# Patient Record
Sex: FEMALE | URBAN - METROPOLITAN AREA
[De-identification: names, ages, dates, MRNs, and addresses within clinical notes are randomized per-mention and may not be internally consistent; named-entity substitution may affect disease eponyms.]

---

## 2021-07-01 ENCOUNTER — OFFICE VISIT (OUTPATIENT)
Dept: URBAN - METROPOLITAN AREA CLINIC 59 | Facility: CLINIC | Age: 76
End: 2021-07-01
Payer: MEDICARE

## 2021-07-01 DIAGNOSIS — H25.13 AGE-RELATED NUCLEAR CATARACT, BILATERAL: ICD-10-CM

## 2021-07-01 DIAGNOSIS — H52.4 PRESBYOPIA: ICD-10-CM

## 2021-07-01 DIAGNOSIS — M35.00 SICCA SYNDROME, UNSPECIFIED: Primary | ICD-10-CM

## 2021-07-01 PROCEDURE — 92004 COMPRE OPH EXAM NEW PT 1/>: CPT | Performed by: OPTOMETRIST

## 2021-07-01 PROCEDURE — 92025 CPTRIZED CORNEAL TOPOGRAPHY: CPT | Performed by: OPTOMETRIST

## 2021-07-01 PROCEDURE — 92310 CONTACT LENS FITTING OU: CPT | Performed by: OPTOMETRIST

## 2021-07-01 ASSESSMENT — INTRAOCULAR PRESSURE
OD: 17
OS: 16

## 2021-07-01 ASSESSMENT — VISUAL ACUITY
OD: 20/20
OS: 20/20

## 2021-07-01 NOTE — IMPRESSION/PLAN
Impression: Presbyopia: H52.4. Plan: Patient given new CLs today. Patient educated that Sjogren's syndrome make it difficult to wear contacts.

## 2021-07-01 NOTE — IMPRESSION/PLAN
Impression: Sicca syndrome, Sjogrens syndrome: M35.00. Plan: Patient diagnosed with Sjogrens. Patient continue to use artificial tears QID. Patient educated on gel drops for night time. Topography done today. Patient educated that dry eye makes it difficult to wear contacts. Patient to wear part time. Monitor. 

Emre: Irregular tear film OU

## 2022-07-07 ENCOUNTER — OFFICE VISIT (OUTPATIENT)
Dept: URBAN - METROPOLITAN AREA CLINIC 59 | Facility: CLINIC | Age: 77
End: 2022-07-07
Payer: MEDICARE

## 2022-07-07 DIAGNOSIS — M35.00 SICCA SYNDROME, UNSPECIFIED: ICD-10-CM

## 2022-07-07 DIAGNOSIS — H25.13 AGE-RELATED NUCLEAR CATARACT, BILATERAL: Primary | ICD-10-CM

## 2022-07-07 PROCEDURE — 92014 COMPRE OPH EXAM EST PT 1/>: CPT | Performed by: OPTOMETRIST

## 2022-07-07 ASSESSMENT — INTRAOCULAR PRESSURE
OS: 16
OD: 17

## 2022-07-07 NOTE — IMPRESSION/PLAN
Impression: Sicca syndrome, Sjogrens syndrome: M35.00. Plan: Patient continue to use artificial tears QID and gel at bedtime.

## 2023-07-06 ENCOUNTER — OFFICE VISIT (OUTPATIENT)
Dept: URBAN - METROPOLITAN AREA CLINIC 59 | Facility: CLINIC | Age: 78
End: 2023-07-06
Payer: MEDICARE

## 2023-07-06 DIAGNOSIS — H52.4 PRESBYOPIA: ICD-10-CM

## 2023-07-06 DIAGNOSIS — H25.13 AGE-RELATED NUCLEAR CATARACT, BILATERAL: Primary | ICD-10-CM

## 2023-07-06 DIAGNOSIS — M35.00 SICCA SYNDROME, UNSPECIFIED: ICD-10-CM

## 2023-07-06 PROCEDURE — 99214 OFFICE O/P EST MOD 30 MIN: CPT | Performed by: OPTOMETRIST

## 2023-07-06 ASSESSMENT — INTRAOCULAR PRESSURE
OD: 16
OS: 16

## 2023-07-06 NOTE — IMPRESSION/PLAN
Impression: Sicca syndrome, Sjogrens syndrome: M35.00. Plan: Discussed findings in detail with patient. Patient to continue using artificial tears throughout the day to help alleviate dry eye symptoms.

## 2024-07-09 ENCOUNTER — OFFICE VISIT (OUTPATIENT)
Dept: URBAN - METROPOLITAN AREA CLINIC 59 | Facility: CLINIC | Age: 79
End: 2024-07-09
Payer: MEDICARE

## 2024-07-09 DIAGNOSIS — H43.813 VITREOUS DEGENERATION, BILATERAL: ICD-10-CM

## 2024-07-09 DIAGNOSIS — H25.813 COMBINED FORMS OF AGE-RELATED CATARACT, BILATERAL: Primary | ICD-10-CM

## 2024-07-09 DIAGNOSIS — M35.00 SICCA SYNDROME, UNSPECIFIED: ICD-10-CM

## 2024-07-09 PROCEDURE — 92014 COMPRE OPH EXAM EST PT 1/>: CPT | Performed by: OPTOMETRIST

## 2024-07-09 ASSESSMENT — INTRAOCULAR PRESSURE
OD: 15
OS: 16